# Patient Record
Sex: FEMALE | Race: WHITE | URBAN - METROPOLITAN AREA
[De-identification: names, ages, dates, MRNs, and addresses within clinical notes are randomized per-mention and may not be internally consistent; named-entity substitution may affect disease eponyms.]

---

## 2017-02-16 ENCOUNTER — OFFICE VISIT (OUTPATIENT)
Dept: FAMILY MEDICINE CLINIC | Age: 57
End: 2017-02-16

## 2017-02-16 VITALS
HEIGHT: 64 IN | RESPIRATION RATE: 18 BRPM | HEART RATE: 79 BPM | BODY MASS INDEX: 30.73 KG/M2 | TEMPERATURE: 96.9 F | OXYGEN SATURATION: 98 % | WEIGHT: 180 LBS | SYSTOLIC BLOOD PRESSURE: 120 MMHG | DIASTOLIC BLOOD PRESSURE: 88 MMHG

## 2017-02-16 DIAGNOSIS — R06.02 SOB (SHORTNESS OF BREATH): ICD-10-CM

## 2017-02-16 DIAGNOSIS — E04.1 THYROID NODULE: ICD-10-CM

## 2017-02-16 DIAGNOSIS — R59.0 ABDOMINAL LYMPHADENOPATHY: Primary | ICD-10-CM

## 2017-02-16 DIAGNOSIS — D25.9 UTERINE LEIOMYOMA, UNSPECIFIED LOCATION: ICD-10-CM

## 2017-02-16 DIAGNOSIS — I51.7 CARDIOMEGALY: ICD-10-CM

## 2017-02-16 PROBLEM — K80.20 CHOLELITHIASES: Status: ACTIVE | Noted: 2017-02-16

## 2017-02-16 PROBLEM — K44.9 HIATAL HERNIA: Status: ACTIVE | Noted: 2017-02-16

## 2017-02-16 NOTE — PATIENT INSTRUCTIONS
You have been referred to hematology & oncology. Please call one of the preferred providers listed below and schedule your appointment. Once you have scheduled your appointment, please call the office at [phone] and leave the details of your appointment (provider you will be seeing, appointment date and time) on the voice mail. [facility]  Dr. Betsey Magaña (blood/leukemia)  Dr. Kim Ho [address]., [facility] [address] [facility], [address] [facility]           Thyroid Nodules: Care Instructions  Your Care Instructions  Thyroid nodules are growths or lumps in the thyroid gland. Your thyroid is in the front of your neck. It controls how your body uses energy. You may have tests to see if the nodule is caused by cancer. Most nodules aren't cancer and don't cause problems. Many don't even need treatment. If you do have cancer, it can usually be cured. Treatment will probably include surgery. You may also get radioactive iodine treatment. If your thyroid can't make thyroid hormone after treatment, you can take a pill every day to replace the hormone. Follow-up care is a key part of your treatment and safety. Be sure to make and go to all appointments, and call your doctor if you are having problems. It's also a good idea to know your test results and keep a list of the medicines you take. How can you care for yourself at home? · Be safe with medicines. If you take thyroid hormone medicine:  ¨ Take it exactly as prescribed. Call your doctor if you think you are having a problem with your medicine. If you take the right amount and don't skip doses, you probably won't have side effects. ¨ Do not take it with calcium, vitamins, or iron. ¨ Try not to miss a dose. ¨ Do not take extra doses. This will not help you get better any faster. It may also cause side effects. ¨ Tell your doctor about any medicines you take. This includes over-the-counter medicines.   ¨ Wear a medical alert bracelet or necklace that says you take thyroid hormones. You can buy these at most drugstores. When should you call for help? Call 911 anytime you think you may need emergency care. For example, call if:  · You lose consciousness. Call your doctor now or seek immediate medical care if:  · You have shortness of breath. Watch closely for changes in your health, and be sure to contact your doctor if:  · You have pain in your neck, jaw, or ear. · You have problems swallowing. · You have a \"tickle\" in your throat. · You feel weak and tired. · You have nervousness, a fast heartbeat, hand tremors, problems sleeping, increased sweating, and weight loss. · You do not feel better even though you are taking your medicine. Where can you learn more? Go to http://olesya-mikayla.info/. Enter C460 in the search box to learn more about \"Thyroid Nodules: Care Instructions. \"  Current as of: July 28, 2016  Content Version: 11.1  © 9299-2798 Healthwise, Incorporated. Care instructions adapted under license by [employer] (which disclaims liability or warranty for this information). If you have questions about a medical condition or this instruction, always ask your healthcare professional. [address] any warranty or liability for your use of this information.

## 2017-02-16 NOTE — MR AVS SNAPSHOT
Visit Information Date & Time Provider Department Dept. Phone Encounter #  
 2/16/2017  3:00 PM Moises Stratton MD 3 [facility] [phone] Upcoming Health Maintenance Date Due  
 PAP AKA CERVICAL CYTOLOGY 1/13/2015 BREAST CANCER SCRN MAMMOGRAM 4/21/2017 DTaP/Tdap/Td series (2 - Td) 7/1/2025 COLONOSCOPY 12/2/2026 Allergies as of 2/16/2017  Review Complete On: 2/16/2017 By: Cuong Ferguson LPN No Known Allergies Current Immunizations  Reviewed on 8/11/2015 Name Date Influenza Vaccine 10/14/2014 Influenza Vaccine (Quad) PF 11/18/2016  9:04 AM  
 Pneumococcal Polysaccharide (PPSV-23) 7/1/2015 Tdap 7/1/2015 Not reviewed this visit You Were Diagnosed With   
  
 Codes Comments Abdominal lymphadenopathy    -  Primary ICD-10-CM: R59.0 ICD-9-CM: 882. 6 Thyroid nodule     ICD-10-CM: E04.1 ICD-9-CM: 241.0 Cardiomegaly     ICD-10-CM: I51.7 ICD-9-CM: 429.3 SOB (shortness of breath)     ICD-10-CM: R06.02 
ICD-9-CM: 786.05 Uterine leiomyoma, unspecified location     ICD-10-CM: D25.9 ICD-9-CM: 218.9 Vitals BP Pulse Temp Resp Height(growth percentile) Weight(growth percentile) 120/88 79 96.9 °F (36.1 °C) (Oral) 18 5' 4.02\" (1.626 m) 180 lb (81.6 kg) SpO2 BMI OB Status Smoking Status 98% 30.88 kg/m2 Postmenopausal Never Smoker BMI and BSA Data Body Mass Index Body Surface Area  
 30.88 kg/m 2 1.92 m 2 Preferred Pharmacy Pharmacy Name Phone 305 [facility], [address] [address] [place] [address] [address] Your Updated Medication List  
  
   
This list is accurate as of: 2/16/17  4:02 PM.  Always use your most recent med list.  
  
  
  
  
 acyclovir 400 mg tablet Commonly known as:  ZOVIRAX Take 1 Tab by mouth two (2) times a day. Take for 5 days as needed  Indications: GENITAL HERPES SIMPLEX  
  
 ketoconazole 200 mg tablet Commonly known as:  NIZORAL Take 1 Tab by mouth daily. levothyroxine 137 mcg tablet Commonly known as:  SYNTHROID Take 137 mcg by mouth Daily (before breakfast). lisinopril-hydroCHLOROthiazide 20-12.5 mg per tablet Commonly known as:  Janyth Huddle Take 1 Tab by mouth daily. omeprazole 20 mg capsule Commonly known as:  PriLOSEC Take 1 Cap by mouth daily. We Performed the Following REFERRAL TO HEMATOLOGY ONCOLOGY [XYE67 Custom] Comments:  
 PT WILL SCHEDULE APPT REFERRAL TO OBSTETRICS AND GYNECOLOGY [REF51 Custom] Comments:  
 PT WILL MAKE APPT. To-Do List   
 02/16/2017 ECHO:  2D ECHO COMPLETE ADULT (TTE) W OR WO CONTR   
  
 02/16/2017 Imaging:  US THYROID/PARATHYROID/SOFT TISS Referral Information Referral ID Referred By Referred To  
  
 [id number] [facility] [place] [address] Phone: [phone] Fax: [phone] Visits Status Start Date End Date 1 New Request 2/16/17 2/16/18 If your referral has a status of pending review or denied, additional information will be sent to support the outcome of this decision. Referral ID Referred By Referred To  
 [id number] Caleb Simon DO  
   [address] [facility] [address] Phone: [phone] Fax: [phone] Visits Status Start Date End Date 1 New Request 2/16/17 2/16/18 If your referral has a status of pending review or denied, additional information will be sent to support the outcome of this decision. Patient Instructions You have been referred to hematology & oncology. Please call one of the preferred providers listed below and schedule your appointment.   Once you have scheduled your appointment, please call the office at [phone] and leave the details of your appointment (provider you will be seeing, appointment date and time) on the voice mail. [address] Dr. Grace Munguia (blood/leukemia) Dr. Oniel [address]ras[address].,  [place], [address] [facility] Thyroid Nodules: Care Instructions Your Care Instructions Thyroid nodules are growths or lumps in the thyroid gland. Your thyroid is in the front of your neck. It controls how your body uses energy. You may have tests to see if the nodule is caused by cancer. Most nodules aren't cancer and don't cause problems. Many don't even need treatment. If you do have cancer, it can usually be cured. Treatment will probably include surgery. You may also get radioactive iodine treatment. If your thyroid can't make thyroid hormone after treatment, you can take a pill every day to replace the hormone. Follow-up care is a key part of your treatment and safety. Be sure to make and go to all appointments, and call your doctor if you are having problems. It's also a good idea to know your test results and keep a list of the medicines you take. How can you care for yourself at home? · Be safe with medicines. If you take thyroid hormone medicine: ¨ Take it exactly as prescribed. Call your doctor if you think you are having a problem with your medicine. If you take the right amount and don't skip doses, you probably won't have side effects. ¨ Do not take it with calcium, vitamins, or iron. ¨ Try not to miss a dose. ¨ Do not take extra doses. This will not help you get better any faster. It may also cause side effects. ¨ Tell your doctor about any medicines you take. This includes over-the-counter medicines. ¨ Wear a medical alert bracelet or necklace that says you take thyroid hormones. You can buy these at most [employer]. When should you call for help? Call 911 anytime you think you may need emergency care. For example, call if: 
· You lose consciousness. Call your doctor now or seek immediate medical care if: · You have shortness of breath. Watch closely for changes in your health, and be sure to contact your doctor if: 
· You have pain in your neck, jaw, or ear. · You have problems swallowing. · You have a \"tickle\" in your throat. · You feel weak and tired. · You have nervousness, a fast heartbeat, hand tremors, problems sleeping, increased sweating, and weight loss. · You do not feel better even though you are taking your medicine. Where can you learn more? Go to http://olesya-mikayla.info/. Enter E684 in the search box to learn more about \"Thyroid Nodules: Care Instructions. \" Current as of: July 28, 2016 Content Version: 11.1 © 3717-9761 [employer], Incorporated. Care instructions adapted under license by [employer] (which disclaims liability or warranty for this information). If you have questions about a medical condition or this instruction, always ask your healthcare professional. [place] [address] any warranty or liability for your use of this information. Introducing [facility]! [employer] introduces SWIIM System patient portal. Now you can access parts of your medical record, email your doctor's office, and request medication refills online. 1. In your internet browser, go to [email or web address] 2. Click on the First Time User? Click Here link in the Sign In box. You will see the New Member Sign Up page. 3. Enter your SWIIM System Access Code exactly as it appears below. You will not need to use this code after youve completed the sign-up process. If you do not sign up before the expiration date, you must request a new code. · SWIIM System Access Code: [email or web address] Expires: 5/17/2017  4:02 PM 
 
4. Enter the last four digits of your Social Security Number (xxxx) and Date of Birth (mm/dd/yyyy) as indicated and click Submit. You will be taken to the next sign-up page. 5. Create a Vanilla Breeze ID. This will be your Vanilla Breeze login ID and cannot be changed, so think of one that is secure and easy to remember. 6. Create a Vanilla Breeze password. You can change your password at any time. 7. Enter your Password Reset Question and Answer. This can be used at a later time if you forget your password. 8. Enter your e-mail address. You will receive e-mail notification when new information is available in [address] 9. Click Sign Up. You can now view and download portions of your medical record. 10. Click the Download Summary menu link to download a portable copy of your medical information. If you have questions, please visit the Frequently Asked Questions section of the Vanilla Breeze website. Remember, Vanilla Breeze is NOT to be used for urgent needs. For medical emergencies, dial 911. Now available from your iPhone and Android! Please provide this summary of care documentation to your next provider. Your primary care clinician is listed as Cecy Miller. If you have any questions after today's visit, please call [phone].

## 2017-02-16 NOTE — PROGRESS NOTES
1. Have you been to the ER, urgent care clinic since your last visit? Hospitalized since your last visit?no    2. Have you seen or consulted any other health care providers outside of the [address] [place] [address] since your last visit? Include any pap smears or colon screening. No    Patient Caro Tapia is a 60 y.o. female presents today for consultation for virtual physical scan result.

## 2017-02-16 NOTE — PROGRESS NOTES
Assessment/Plan:    Blessing Brown was seen today for advice only. Diagnoses and all orders for this visit:    Abdominal lymphadenopathy  -     REFERRAL TO HEMATOLOGY ONCOLOGY    Thyroid nodule  -     US THYROID/PARATHYROID/SOFT TISS; Future    Cardiomegaly  -     2D ECHO COMPLETE ADULT (TTE) W OR WO CONTR; Future    SOB (shortness of breath)  -     2D ECHO COMPLETE ADULT (TTE) W OR WO CONTR; Future    Uterine leiomyoma, unspecified location  -     REFERRAL TO OBSTETRICS AND GYNECOLOGY        Will do the above evaluation and contact pt with results. Will await recommendations from the specialists. The plan was discussed with the patient. The patient verbalized understanding and is in agreement with the plan. All medication potential side effects were discussed with the patient.    -------------------------------------------------------------------------------------------------------------------        Michelle Sinclair is a 60 y.o. female and presents with Advice Only         Subjective:  Pt comes today because she wanted to discuss some findings on a recent virtual physical that she had. It essentially was a almost full body CT. She has felt very well and does not have any complaints. The following was noted on the CT:    1. A 6 mm nodule in the thyroid. 2. Periaortic enlarged LNs, that have been stable over the last 3 years. 3. Multiple gallstones. 4. Hiatal hernia  5. A 6.6 cm calcified mass in the uterus, appearing to be a fibroid. 6. The cardiac silhouette is enlarged. She has a strong fam hx for serious health issues and she wants to be proactive about her health. ROS:  Constitutional: No recent weight change. No weakness/fatigue. No f/c. Skin: No rashes, change in nails/hair, itching   HENT: No HA, dizziness. No hearing loss/tinnitus. No nasal congestion/discharge. Eyes: No change in vision, double/blurred vision or eye pain/redness. Cardiovascular: No CP/palpitations.   No BOLES/orthopnea/PND. Respiratory: No cough/sputum, dyspnea, wheezing. Gastointestinal: No dysphagia, reflux. No n/v. No constipation/diarrhea. No melena/rectal bleeding. Genitourinary: No dysuria, urinary hesitancy, nocturia, hematuria. No incontinence. Musculoskeletal: No joint pain/stiffness. No muscle pain/tenderness. Endo: No heat/cold intolerance, no polyuria/polydypsia. Heme: No h/o anemia. No easy bleeding/bruising. Allergy/Immunology: No seasonal rhinitis. Denies frequent colds, sinus/ear infections. Neurological: No seizures/numbness/weakness. No paresthesias. Psychiatric:  No depression, anxiety. The problem list was updated as a part of today's visit. Patient Active Problem List   Diagnosis Code    History of bacterial pneumonia Z87.01    Herpes simplex B00.9    Hypothyroidism E03.9    GERD (gastroesophageal reflux disease) K21.9    Tinea corporis B35.4       The PSH, FH were reviewed. SH:  Social History   Substance Use Topics    Smoking status: Never Smoker    Smokeless tobacco: Never Used    Alcohol use 4.2 oz/week     7 Glasses of wine per week       Medications/Allergies:  Current Outpatient Prescriptions on File Prior to Visit   Medication Sig Dispense Refill    omeprazole (PRILOSEC) 20 mg capsule Take 1 Cap by mouth daily. 90 Cap 2    acyclovir (ZOVIRAX) 400 mg tablet Take 1 Tab by mouth two (2) times a day. Take for 5 days as needed  Indications: GENITAL HERPES SIMPLEX 90 Tab 2    levothyroxine (SYNTHROID) 137 mcg tablet Take 137 mcg by mouth Daily (before breakfast). 90 Tab 2    lisinopril-hydrochlorothiazide (PRINZIDE, ZESTORETIC) 20-12.5 mg per tablet Take 1 Tab by mouth daily. 90 Tab 2    ketoconazole (NIZORAL) 200 mg tablet Take 1 Tab by mouth daily. 30 Tab 0     No current facility-administered medications on file prior to visit.          No Known Allergies      Health Maintenance:   Health Maintenance   Topic Date Due    PAP AKA CERVICAL CYTOLOGY  01/13/2015    BREAST CANCER SCRN MAMMOGRAM  04/21/2017    DTaP/Tdap/Td series (2 - Td) 07/01/2025    COLONOSCOPY  12/02/2026    Hepatitis C Screening  Completed    INFLUENZA AGE 9 TO ADULT  Completed       Objective:  Visit Vitals    /88    Pulse 79    Temp 96.9 °F (36.1 °C) (Oral)    Resp 18    Ht 5' 4.02\" (1.626 m)    Wt 180 lb (81.6 kg)    SpO2 98%    BMI 30.88 kg/m2          Nurses notes and VS reviewed. Physical Examination: General appearance - alert, well appearing, and in no distress  Chest - clear to auscultation, no wheezes, rales or rhonchi, symmetric air entry  Heart - normal rate, regular rhythm, normal S1, S2, no murmurs, rubs, clicks or gallops  Abdomen - soft, nontender, nondistended, no masses or organomegaly        Labwork and Ancillary Studies:    CBC w/Diff  Lab Results   Component Value Date/Time    WBC 4.6 06/17/2016 08:35 AM    HGB 13.4 06/17/2016 08:35 AM    PLATELET 218 63/56/8171 08:35 AM         Basic Metabolic Profile  Lab Results   Component Value Date/Time    Sodium 141 06/17/2016 08:35 AM    Potassium 3.9 06/17/2016 08:35 AM    Chloride 103 06/17/2016 08:35 AM    CO2 29 06/17/2016 08:35 AM    Anion gap 9 06/17/2016 08:35 AM    Glucose 98 06/17/2016 08:35 AM    BUN 17 06/17/2016 08:35 AM    Creatinine 0.90 06/17/2016 08:35 AM    BUN/Creatinine ratio 19 06/17/2016 08:35 AM    GFR est AA >60 06/17/2016 08:35 AM    GFR est non-AA >60 06/17/2016 08:35 AM    Calcium 9.0 06/17/2016 08:35 AM         LFT  Lab Results   Component Value Date/Time    ALT (SGPT) 18 06/17/2016 08:35 AM    AST (SGOT) 12 06/17/2016 08:35 AM    Alk.  phosphatase 88 06/17/2016 08:35 AM    Bilirubin, direct 0.2 06/17/2016 08:35 AM    Bilirubin, total 0.7 06/17/2016 08:35 AM         Cholesterol  Lab Results   Component Value Date/Time    Cholesterol, total 149 06/17/2016 08:35 AM    HDL Cholesterol 66 06/17/2016 08:35 AM    LDL, calculated 66.2 06/17/2016 08:35 AM    Triglyceride 84 06/17/2016 08:35 AM    CHOL/HDL Ratio 2.3 06/17/2016 08:35 AM

## 2017-03-06 ENCOUNTER — OFFICE VISIT (OUTPATIENT)
Dept: OBGYN CLINIC | Age: 57
End: 2017-03-06

## 2017-03-06 ENCOUNTER — HOSPITAL ENCOUNTER (OUTPATIENT)
Dept: LAB | Age: 57
Discharge: HOME | End: 2017-03-06
Payer: COMMERCIAL

## 2017-03-06 VITALS
DIASTOLIC BLOOD PRESSURE: 85 MMHG | SYSTOLIC BLOOD PRESSURE: 118 MMHG | HEART RATE: 86 BPM | RESPIRATION RATE: 18 BRPM | HEIGHT: 64 IN | BODY MASS INDEX: 30.39 KG/M2 | WEIGHT: 178 LBS

## 2017-03-06 DIAGNOSIS — D25.2 SUBSEROUS LEIOMYOMA OF UTERUS: ICD-10-CM

## 2017-03-06 DIAGNOSIS — Z01.411 ENCOUNTER FOR GYNECOLOGICAL EXAMINATION WITH ABNORMAL FINDING: Primary | ICD-10-CM

## 2017-03-06 PROCEDURE — 88175 CYTOPATH C/V AUTO FLUID REDO: CPT | Performed by: OBSTETRICS & GYNECOLOGY

## 2017-03-06 PROCEDURE — 87624 HPV HI-RISK TYP POOLED RSLT: CPT | Performed by: OBSTETRICS & GYNECOLOGY

## 2017-03-06 NOTE — PROGRESS NOTES
Subjective:   60 y.o. female for Well Woman Check. No LMP recorded. Patient is postmenopausal.    Social History: not sexually active. Pertinent past medical hstory: no history of HTN, DVT, CAD, DM, liver disease, migraines or smoking. Current Outpatient Prescriptions   Medication Sig Dispense Refill    omeprazole (PRILOSEC) 20 mg capsule Take 1 Cap by mouth daily. 90 Cap 2    acyclovir (ZOVIRAX) 400 mg tablet Take 1 Tab by mouth two (2) times a day. Take for 5 days as needed  Indications: GENITAL HERPES SIMPLEX 90 Tab 2    levothyroxine (SYNTHROID) 137 mcg tablet Take 137 mcg by mouth Daily (before breakfast). 90 Tab 2    lisinopril-hydrochlorothiazide (PRINZIDE, ZESTORETIC) 20-12.5 mg per tablet Take 1 Tab by mouth daily. 90 Tab 2    ketoconazole (NIZORAL) 200 mg tablet Take 1 Tab by mouth daily. 27 Tab 0     No Known Allergies  Past Medical History:   Diagnosis Date    Cholelithiases 2017    GERD (gastroesophageal reflux disease)     H/O cardiovascular stress test 3/9/2010    normal    H/O echocardiogram 3/25/2010    normal    Herpes simplex     Hiatal hernia 2017    History of bacterial pneumonia     Hypothyroidism     Thyroid nodule 2017    Tinea corporis 2016    Uterine fibroid 2017     Past Surgical History:   Procedure Laterality Date    HX  SECTION      HX DILATION AND CURETTAGE  4141,8885    HX GYN      rt breast lump     Family History   Problem Relation Age of Onset    Hypertension Mother     Cancer Father      bone cancer    Cancer Sister      large neuroendocrine carcinoma      No Known Problems Brother     No Known Problems Sister      Social History   Substance Use Topics    Smoking status: Never Smoker    Smokeless tobacco: Never Used    Alcohol use 4.2 oz/week     7 Glasses of wine per week        ROS:  Feeling well. No dyspnea or chest pain on exertion. No abdominal pain, change in bowel habits, black or bloody stools.   No urinary tract symptoms. GYN ROS: no breast pain or new or enlarging lumps on self exam, no vaginal bleeding, no discharge or pelvic pain. No neurological complaints. Objective:     Visit Vitals    /85    Pulse 86    Resp 18    Ht 5' 4\" (1.626 m)    Wt 178 lb (80.7 kg)    BMI 30.55 kg/m2     The patient appears well, alert, oriented x 3, in no distress. ENT normal.  Neck supple. No adenopathy or thyromegaly. MICHAEL. Lungs are clear, good air entry, no wheezes, rhonchi or rales. S1 and S2 normal, no murmurs, regular rate and rhythm. Abdomen soft without tenderness, guarding, mass or organomegaly. Extremities show no edema, normal peripheral pulses. Neurological is normal, no focal findings. BREAST EXAM: breasts appear normal, no suspicious masses, no skin or nipple changes or axillary nodes    PELVIC EXAM: normal external genitalia, vulva, vagina, cervix, uterus and adnexa, PAP: Pap smear done today, HPV test    CT scan from 1/26/17 reviewed and discussed: 6.6 cm calcified pelvic mass likely representing a pedunculated fibroid    Assessment/Plan:   well woman  Uterine mass, pelvic ultrasound pending. mammogram  pap smear  return annually or prn  Will contact the patient with the result of the ultrasound. Plan of care discussed. Patient expressed understanding.

## 2017-03-21 ENCOUNTER — TELEPHONE (OUTPATIENT)
Dept: FAMILY MEDICINE CLINIC | Age: 57
End: 2017-03-21

## 2017-03-21 NOTE — TELEPHONE ENCOUNTER
[facility] called; they need the pt's last OV notes, pt last 2 labs and the pathology report;  Fax # [phone]

## 2017-05-10 RX ORDER — LEVOTHYROXINE SODIUM 137 UG/1
TABLET ORAL
Qty: 90 TAB | Refills: 0 | Status: SHIPPED | OUTPATIENT
Start: 2017-05-10 | End: 2017-07-29 | Stop reason: SDUPTHER

## 2017-07-30 DIAGNOSIS — Z00.00 ANNUAL PHYSICAL EXAM: Primary | ICD-10-CM

## 2017-07-30 DIAGNOSIS — E03.4 HYPOTHYROIDISM DUE TO ACQUIRED ATROPHY OF THYROID: ICD-10-CM

## 2017-08-11 RX ORDER — LEVOTHYROXINE SODIUM 137 UG/1
TABLET ORAL
Qty: 90 TAB | Refills: 1 | Status: SHIPPED | OUTPATIENT
Start: 2017-08-11 | End: 2018-01-12 | Stop reason: SDUPTHER

## 2017-11-20 NOTE — TELEPHONE ENCOUNTER
Patient is requesting to have a generic and a 90 day supply sent in. Patient is calling stating that she has lost her insurance  And is wondering if the provider can advise her of what lab work is needed to be done , where it will not cost the most. Patient is worried about her thyroid and her High Blood pressure.      Please advise

## 2017-11-23 DIAGNOSIS — E03.4 HYPOTHYROIDISM DUE TO ACQUIRED ATROPHY OF THYROID: Primary | ICD-10-CM

## 2017-11-23 DIAGNOSIS — I10 ESSENTIAL HYPERTENSION: ICD-10-CM

## 2017-11-23 RX ORDER — LISINOPRIL AND HYDROCHLOROTHIAZIDE 12.5; 2 MG/1; MG/1
1 TABLET ORAL DAILY
Qty: 90 TAB | Refills: 0 | Status: SHIPPED | OUTPATIENT
Start: 2017-11-23 | End: 2017-11-27 | Stop reason: SDUPTHER

## 2017-11-23 NOTE — TELEPHONE ENCOUNTER
See message below. As I previously mentioned to her, she is due for a physical.  I realize that she does not have insurance but I will order just a few labs that are important in order to maintain her health and based on the medications she takes.

## 2017-11-27 RX ORDER — LISINOPRIL AND HYDROCHLOROTHIAZIDE 12.5; 2 MG/1; MG/1
1 TABLET ORAL DAILY
Qty: 10 TAB | Refills: 0 | Status: SHIPPED | OUTPATIENT
Start: 2017-11-27 | End: 2018-01-12 | Stop reason: SDUPTHER

## 2017-11-27 NOTE — TELEPHONE ENCOUNTER
Patient is requesting to have a 10 day supply called in for her medication. Her mail order will not be sent to her for another 10 days.      Patient is completely out of the Lisinopril-Hydrochlorothiazide 12.5 mg.     Please send the medication to the [address]   p) [phone]

## 2018-01-12 ENCOUNTER — OFFICE VISIT (OUTPATIENT)
Dept: FAMILY MEDICINE CLINIC | Age: 58
End: 2018-01-12

## 2018-01-12 VITALS
TEMPERATURE: 98 F | BODY MASS INDEX: 31.76 KG/M2 | HEART RATE: 80 BPM | DIASTOLIC BLOOD PRESSURE: 80 MMHG | WEIGHT: 186 LBS | HEIGHT: 64 IN | SYSTOLIC BLOOD PRESSURE: 124 MMHG | OXYGEN SATURATION: 97 % | RESPIRATION RATE: 20 BRPM

## 2018-01-12 DIAGNOSIS — L29.9 PRURITUS: ICD-10-CM

## 2018-01-12 DIAGNOSIS — Z00.00 ANNUAL PHYSICAL EXAM: Primary | ICD-10-CM

## 2018-01-12 LAB
A-G RATIO,AGRAT: 1.4 RATIO (ref 1.1–2.6)
A-G RATIO,AGRAT: 1.4 RATIO (ref 1.1–2.6)
ABSOLUTE LYMPHOCYTE COUNT, 10803: 1.7 K/UL (ref 1–4.8)
ALBUMIN SERPL-MCNC: 4 G/DL (ref 3.5–5)
ALBUMIN SERPL-MCNC: 4 G/DL (ref 3.5–5)
ALP SERPL-CCNC: 79 U/L (ref 25–115)
ALP SERPL-CCNC: 79 U/L (ref 25–115)
ALT SERPL-CCNC: 11 U/L (ref 5–40)
ALT SERPL-CCNC: 11 U/L (ref 5–40)
ANION GAP SERPL CALC-SCNC: 15 MMOL/L
AST SERPL W P-5'-P-CCNC: 14 U/L (ref 10–37)
AST SERPL W P-5'-P-CCNC: 14 U/L (ref 10–37)
BASOPHILS # BLD: 0 K/UL (ref 0–0.2)
BASOPHILS NFR BLD: 1 % (ref 0–2)
BILIRUB SERPL-MCNC: 0.2 MG/DL (ref 0.2–1.2)
BILIRUB SERPL-MCNC: 0.2 MG/DL (ref 0.2–1.2)
BILIRUBIN, DIRECT,CBIL: <0.2 MG/DL (ref 0–0.3)
BUN SERPL-MCNC: 23 MG/DL (ref 6–22)
CALCIUM SERPL-MCNC: 9.1 MG/DL (ref 8.4–10.5)
CHLORIDE SERPL-SCNC: 101 MMOL/L (ref 98–110)
CHOLEST SERPL-MCNC: 176 MG/DL (ref 110–200)
CO2 SERPL-SCNC: 27 MMOL/L (ref 20–32)
CREAT SERPL-MCNC: 1 MG/DL (ref 0.5–1.2)
EOSINOPHIL # BLD: 0.2 K/UL (ref 0–0.5)
EOSINOPHIL NFR BLD: 3 % (ref 0–6)
ERYTHROCYTE [DISTWIDTH] IN BLOOD BY AUTOMATED COUNT: 13.8 % (ref 10–16)
GFRAA, 66117: >60
GFRNA, 66118: 54.2
GLOBULIN,GLOB: 2.9 G/DL (ref 2–4)
GLOBULIN,GLOB: 2.9 G/DL (ref 2–4)
GLUCOSE SERPL-MCNC: 102 MG/DL (ref 70–99)
GRANULOCYTES,GRANS: 53 % (ref 40–75)
HCT VFR BLD AUTO: 40.1 % (ref 35.1–48)
HDLC SERPL-MCNC: 74 MG/DL (ref 40–59)
HGB BLD-MCNC: 13.2 G/DL (ref 11.7–16)
LDLC SERPL CALC-MCNC: 86 MG/DL (ref 50–99)
LYMPHOCYTES, LYMLT: 32 % (ref 27–45)
MCH RBC QN AUTO: 30 PG (ref 26–34)
MCHC RBC AUTO-ENTMCNC: 33 G/DL (ref 32–36)
MCV RBC AUTO: 92 FL (ref 80–95)
MONOCYTES # BLD: 0.6 K/UL (ref 0.1–0.9)
MONOCYTES NFR BLD: 11 % (ref 3–9)
NEUTROPHILS # BLD AUTO: 2.7 K/UL (ref 1.8–7.7)
PLATELET # BLD AUTO: 280 K/UL (ref 140–440)
PMV BLD AUTO: 10.4 FL (ref 6–10.8)
POTASSIUM SERPL-SCNC: 4 MMOL/L (ref 3.5–5.5)
PROT SERPL-MCNC: 6.9 G/DL (ref 6.4–8.3)
PROT SERPL-MCNC: 6.9 G/DL (ref 6.4–8.3)
RBC # BLD AUTO: 4.35 M/UL (ref 3.8–5.2)
SODIUM SERPL-SCNC: 143 MMOL/L (ref 133–145)
TRIGL SERPL-MCNC: 78 MG/DL (ref 40–149)
TSH SERPL DL<=0.005 MIU/L-ACNC: 2.52 MCU/ML (ref 0.27–4.2)
VLDLC SERPL CALC-MCNC: 16 MG/DL (ref 8–30)
WBC # BLD AUTO: 5.2 K/UL (ref 4–11)

## 2018-01-12 RX ORDER — LISINOPRIL AND HYDROCHLOROTHIAZIDE 12.5; 2 MG/1; MG/1
1 TABLET ORAL DAILY
Qty: 90 TAB | Refills: 1 | Status: SHIPPED | OUTPATIENT
Start: 2018-01-12

## 2018-01-12 RX ORDER — OMEPRAZOLE 20 MG/1
CAPSULE, DELAYED RELEASE ORAL
Qty: 90 CAP | Refills: 1 | Status: SHIPPED | OUTPATIENT
Start: 2018-01-12

## 2018-01-12 RX ORDER — HYDROXYZINE 25 MG/1
25 TABLET, FILM COATED ORAL
Qty: 30 TAB | Refills: 2 | Status: SHIPPED | OUTPATIENT
Start: 2018-01-12

## 2018-01-12 RX ORDER — LEVOTHYROXINE SODIUM 137 UG/1
TABLET ORAL
Qty: 90 TAB | Refills: 1 | Status: SHIPPED | OUTPATIENT
Start: 2018-01-12

## 2018-01-12 NOTE — PROGRESS NOTES
SUBJECTIVE:   59 y.o. female for annual routine checkup. Pt will be moving soon to [address] [place] [address]. Had her abdominoplasty with Dr. Yasmine Rossi, all went well. Current Outpatient Prescriptions   Medication Sig Dispense Refill    lisinopril-hydroCHLOROthiazide (PRINZIDE, ZESTORETIC) 20-12.5 mg per tablet Take 1 Tab by mouth daily. 90 Tab 1    omeprazole (PRILOSEC) 20 mg capsule Take 1 capsule by mouth  daily 90 Cap 1    levothyroxine (SYNTHROID) 137 mcg tablet Take 1 tablet by mouth  daily before breakfast 90 Tab 1    hydrOXYzine HCl (ATARAX) 25 mg tablet Take 1 Tab by mouth nightly as needed for Itching. Indications: Pruritus of Skin 30 Tab 2    acyclovir (ZOVIRAX) 400 mg tablet Take 1 Tab by mouth two (2) times a day. Take for 5 days as needed  Indications: GENITAL HERPES SIMPLEX 90 Tab 2    ketoconazole (NIZORAL) 200 mg tablet Take 1 Tab by mouth daily. 27 Tab 0       Past Medical History:   Diagnosis Date    Cholelithiases 2/16/2017    GERD (gastroesophageal reflux disease)     H/O cardiovascular stress test 3/9/2010    normal    H/O echocardiogram 3/25/2010    normal    Herpes simplex     Hiatal hernia 2/16/2017    History of bacterial pneumonia     HTN (hypertension) 11/23/2017    Hypothyroidism     Thyroid nodule 2/16/2017    Tinea corporis 8/17/2016    Uterine fibroid 2/16/2017       Allergies: Review of patient's allergies indicates no known allergies. No LMP recorded. Patient is postmenopausal.      ROS:  Feeling well. No dyspnea or chest pain on exertion. No abdominal pain, change in bowel habits, black or bloody stools. No urinary tract symptoms. GYN ROS: no breast pain or new or enlarging lumps on self exam. No neurological complaints. OBJECTIVE:   The patient appears well, alert, oriented x 3, in no distress.     Visit Vitals    /80 (BP 1 Location: Right arm, BP Patient Position: Standing)    Pulse 80    Temp 98 °F (36.7 °C) (Oral)    Resp 20    Ht 5' 4\" (1.626 m)    Wt 186 lb (84.4 kg)    SpO2 97%    BMI 31.93 kg/m2       General appearance: alert, cooperative, no distress, appears stated age  Head: Normocephalic, without obvious abnormality, atraumatic  Ears: normal TM's and external ear canals AU  Throat: Lips, mucosa, and tongue normal. Teeth and gums normal  Neck: supple, symmetrical, trachea midline, no adenopathy, thyroid: not enlarged, symmetric, no tenderness/mass/nodules, no carotid bruit and no JVD  Back: symmetric, no curvature. ROM normal. No CVA tenderness. Lungs: clear to auscultation bilaterally  Heart: regular rate and rhythm, S1, S2 normal, no murmur, click, rub or gallop  Abdomen: soft, non-tender. Bowel sounds normal. No masses,  no organomegaly  Extremities: extremities normal, atraumatic, no cyanosis or edema  Pulses: 2+ and symmetric  Skin: Skin color, texture, turgor normal. No rashes or lesions  Neurological is normal, no focal findings. Lab Results   Component Value Date/Time    WBC 4.6 06/17/2016 08:35 AM    HGB 13.4 06/17/2016 08:35 AM    HCT 41.8 06/17/2016 08:35 AM    PLATELET 595 87/80/9284 08:35 AM    MCV 93.7 06/17/2016 08:35 AM         Lab Results   Component Value Date/Time    Sodium 141 06/17/2016 08:35 AM    Potassium 3.9 06/17/2016 08:35 AM    Chloride 103 06/17/2016 08:35 AM    CO2 29 06/17/2016 08:35 AM    Anion gap 9 06/17/2016 08:35 AM    Glucose 98 06/17/2016 08:35 AM    BUN 17 06/17/2016 08:35 AM    Creatinine 0.90 06/17/2016 08:35 AM    BUN/Creatinine ratio 19 06/17/2016 08:35 AM    GFR est AA >60 06/17/2016 08:35 AM    GFR est non-AA >60 06/17/2016 08:35 AM    Calcium 9.0 06/17/2016 08:35 AM         Lab Results   Component Value Date/Time    ALT (SGPT) 18 06/17/2016 08:35 AM    AST (SGOT) 12 06/17/2016 08:35 AM    Alk.  phosphatase 88 06/17/2016 08:35 AM    Bilirubin, direct 0.2 06/17/2016 08:35 AM    Bilirubin, total 0.7 06/17/2016 08:35 AM         Lab Results   Component Value Date/Time    Cholesterol, total 149 06/17/2016 08:35 AM    HDL Cholesterol 66 06/17/2016 08:35 AM    LDL, calculated 66.2 06/17/2016 08:35 AM    VLDL, calculated 16.8 06/17/2016 08:35 AM    Triglyceride 84 06/17/2016 08:35 AM    CHOL/HDL Ratio 2.3 06/17/2016 08:35 AM         Lab Results   Component Value Date/Time    TSH 2.99 11/18/2016 08:43 AM    T4, Free 1.3 11/18/2016 08:43 AM         Lab Results   Component Value Date/Time    Vitamin D 25-Hydroxy 39.5 06/17/2016 08:35 AM             ASSESSMENT:   Diagnoses and all orders for this visit:    1. Annual physical exam    2. Pruritus    Other orders  -     lisinopril-hydroCHLOROthiazide (PRINZIDE, ZESTORETIC) 20-12.5 mg per tablet; Take 1 Tab by mouth daily. -     omeprazole (PRILOSEC) 20 mg capsule; Take 1 capsule by mouth  daily  -     levothyroxine (SYNTHROID) 137 mcg tablet; Take 1 tablet by mouth  daily before breakfast  -     hydrOXYzine HCl (ATARAX) 25 mg tablet; Take 1 Tab by mouth nightly as needed for Itching. Indications: Pruritus of Skin          PLAN:   mammogram  pap smear     Pt was given 6 mon on her meds to last her until she finds a new PCP. Was reminded that she is a little past due for her mammogram.  She will get this done with her new doctor. Will try Atarax for itchy skin. The plan was discussed with the patient. The patient verbalized understanding and is in agreement with the plan. All medication potential side effects were discussed with the patient.

## 2018-01-12 NOTE — PATIENT INSTRUCTIONS
Well Visit, Women 48 to 72: Care Instructions  Your Care Instructions    Physical exams can help you stay healthy. Your doctor has checked your overall health and may have suggested ways to take good care of yourself. He or she also may have recommended tests. At home, you can help prevent illness with healthy eating, regular exercise, and other steps. Follow-up care is a key part of your treatment and safety. Be sure to make and go to all appointments, and call your doctor if you are having problems. It's also a good idea to know your test results and keep a list of the medicines you take. How can you care for yourself at home? · Reach and stay at a healthy weight. This will lower your risk for many problems, such as obesity, diabetes, heart disease, and high blood pressure. · Get at least 30 minutes of exercise on most days of the week. Walking is a good choice. You also may want to do other activities, such as running, swimming, cycling, or playing tennis or team sports. · Do not smoke. Smoking can make health problems worse. If you need help quitting, talk to your doctor about stop-smoking programs and medicines. These can increase your chances of quitting for good. · Protect your skin from too much sun. When you're outdoors from 10 a.m. to 4 p.m., stay in the shade or cover up with clothing and a hat with a wide brim. Wear sunglasses that block UV rays. Even when it's cloudy, put broad-spectrum sunscreen (SPF 30 or higher) on any exposed skin. · See a dentist one or two times a year for checkups and to have your teeth cleaned. · Wear a seat belt in the car. · Limit alcohol to 1 drink a day. Too much alcohol can cause health problems. Follow your doctor's advice about when to have certain tests. These tests can spot problems early. · Cholesterol.  Your doctor will tell you how often to have this done based on your age, family history, or other things that can increase your risk for heart attack and stroke. · Blood pressure. Have your blood pressure checked during a routine doctor visit. Your doctor will tell you how often to check your blood pressure based on your age, your blood pressure results, and other factors. · Mammogram. Ask your doctor how often you should have a mammogram, which is an X-ray of your breasts. A mammogram can spot breast cancer before it can be felt and when it is easiest to treat. · Pap test and pelvic exam. Ask your doctor how often you should have a Pap test. You may not need to have a Pap test as often as you used to. · Vision. Have your eyes checked every year or two or as often as your doctor suggests. Some experts recommend that you have yearly exams for glaucoma and other age-related eye problems starting at age 48. · Hearing. Tell your doctor if you notice any change in your hearing. You can have tests to find out how well you hear. · Diabetes. Ask your doctor whether you should have tests for diabetes. · Colon cancer. You should begin tests for colon cancer at age 48. You may have one of several tests. Your doctor will tell you how often to have tests based on your age and risk. Risks include whether you already had a precancerous polyp removed from your colon or whether your parents, sisters and brothers, or children have had colon cancer. · Thyroid disease. Talk to your doctor about whether to have your thyroid checked as part of a regular physical exam. Women have an increased chance of a thyroid problem. · Osteoporosis. You should begin tests for bone density at age 72. If you are younger than 72, ask your doctor whether you have factors that may increase your risk for this disease. You may want to have this test before age 72. · Heart attack and stroke risk. At least every 4 to 6 years, you should have your risk for heart attack and stroke assessed.  Your doctor uses factors such as your age, blood pressure, cholesterol, and whether you smoke or have diabetes to show what your risk for a heart attack or stroke is over the next 10 years. When should you call for help? Watch closely for changes in your health, and be sure to contact your doctor if you have any problems or symptoms that concern you. Where can you learn more? Go to http://olesya-mikayla.info/. Enter S093 in the search box to learn more about \"Well Visit, Women 50 to 72: Care Instructions. \"  Current as of: May 12, 2017  Content Version: 11.4  © 8548-1548 Healthwise, Incorporated. Care instructions adapted under license by [employer] (which disclaims liability or warranty for this information). If you have questions about a medical condition or this instruction, always ask your healthcare professional. [address] any warranty or liability for your use of this information.

## 2018-01-12 NOTE — MR AVS SNAPSHOT
Visit Information Date & Time Provider Department Dept. Phone Encounter #  
 1/12/2018  1418 [facility], [address] [place] [address] [phone] [id number] Upcoming Health Maintenance Date Due  
 BREAST CANCER SCRN MAMMOGRAM 4/21/2017 PAP AKA CERVICAL CYTOLOGY 3/6/2020 COLONOSCOPY 12/2/2026 DTaP/Tdap/Td series (3 - Td) 10/10/2027 Allergies as of 1/12/2018  Review Complete On: 1/12/2018 By: Paz Rojas LPN No Known Allergies Current Immunizations  Reviewed on 1/12/2018 Name Date Influenza Vaccine 10/10/2017, 10/14/2014 Influenza Vaccine (Quad) PF 11/18/2016  9:04 AM  
 Pneumococcal Polysaccharide (PPSV-23) 7/1/2015 Tdap 10/10/2017, 7/1/2015 Reviewed by Arlin Diaz MD on 1/12/2018 at  9:51 AM  
You Were Diagnosed With   
  
 Codes Comments Annual physical exam    -  Primary ICD-10-CM: Z00.00 ICD-9-CM: V70.0 Pruritus     ICD-10-CM: L29.9 ICD-9-CM: 698.9 Vitals BP Pulse Temp Resp Height(growth percentile) Weight(growth percentile) 124/80 (BP 1 Location: Right arm, BP Patient Position: Standing) 80 98 °F (36.7 °C) (Oral) 20 5' 4\" (1.626 m) 186 lb (84.4 kg) SpO2 BMI OB Status Smoking Status 97% 31.93 kg/m2 Postmenopausal Never Smoker BMI and BSA Data Body Mass Index Body Surface Area  
 31.93 kg/m 2 1.95 m 2 Preferred Pharmacy Pharmacy Name Phone [id number] [address] Your Updated Medication List  
  
   
This list is accurate as of: 1/12/18  9:57 AM.  Always use your most recent med list.  
  
  
  
  
 acyclovir 400 mg tablet Commonly known as:  ZOVIRAX Take 1 Tab by mouth two (2) times a day. Take for 5 days as needed  Indications: GENITAL HERPES SIMPLEX  
  
 hydrOXYzine HCl 25 mg tablet Commonly known as:  ATARAX Take 1 Tab by mouth nightly as needed for Itching. Indications: Pruritus of Skin ketoconazole 200 mg tablet Commonly known as:  NIZORAL Take 1 Tab by mouth daily. levothyroxine 137 mcg tablet Commonly known as:  SYNTHROID Take 1 tablet by mouth  daily before breakfast  
  
 lisinopril-hydroCHLOROthiazide 20-12.5 mg per tablet Commonly known as:  Luly Fabian Take 1 Tab by mouth daily. omeprazole 20 mg capsule Commonly known as:  PRILOSEC Take 1 capsule by mouth  daily Prescriptions Sent to Pharmacy Refills  
 lisinopril-hydroCHLOROthiazide (PRINZIDE, ZESTORETIC) 20-12.5 mg per tablet 1 Sig: Take 1 Tab by mouth daily. Class: Normal  
 Pharmacy: [facility] [address] [place] [address] Ph #: [phone] Route: Oral  
 omeprazole (PRILOSEC) 20 mg capsule 1 Sig: Take 1 capsule by mouth  daily Class: Normal  
 [id number] [address] Ph #: [phone]  
 levothyroxine (SYNTHROID) 137 mcg tablet 1 Sig: Take 1 tablet by mouth  daily before breakfast  
 Class: Normal  
 Pharmacy: [facility] #[id number] - [address] Ph #: [phone]  
 hydrOXYzine HCl (ATARAX) 25 mg tablet 2 Sig: Take 1 Tab by mouth nightly as needed for Itching. Indications: Pruritus of Skin Class: Normal  
 Pharmacy: [facility]/pharmacy [address] [place] [address] Ph #: [phone] Route: Oral  
  
Patient Instructions Well Visit, Women 48 to 72: Care Instructions Your Care Instructions Physical exams can help you stay healthy. Your doctor has checked your overall health and may have suggested ways to take good care of yourself. He or she also may have recommended tests. At home, you can help prevent illness with healthy eating, regular exercise, and other steps. Follow-up care is a key part of your treatment and safety.  Be sure to make and go to all appointments, and call your doctor if you are having problems. It's also a good idea to know your test results and keep a list of the medicines you take. How can you care for yourself at home? · Reach and stay at a healthy weight. This will lower your risk for many problems, such as obesity, diabetes, heart disease, and high blood pressure. · Get at least 30 minutes of exercise on most days of the week. Walking is a good choice. You also may want to do other activities, such as running, swimming, cycling, or playing tennis or team sports. · Do not smoke. Smoking can make health problems worse. If you need help quitting, talk to your doctor about stop-smoking programs and medicines. These can increase your chances of quitting for good. · Protect your skin from too much sun. When you're outdoors from 10 a.m. to 4 p.m., stay in the shade or cover up with clothing and a hat with a wide brim. Wear sunglasses that block UV rays. Even when it's cloudy, put broad-spectrum sunscreen (SPF 30 or higher) on any exposed skin. · See a dentist one or two times a year for checkups and to have your teeth cleaned. · Wear a seat belt in the car. · Limit alcohol to 1 drink a day. Too much alcohol can cause health problems. Follow your doctor's advice about when to have certain tests. These tests can spot problems early. · Cholesterol. Your doctor will tell you how often to have this done based on your age, family history, or other things that can increase your risk for heart attack and stroke. · Blood pressure. Have your blood pressure checked during a routine doctor visit. Your doctor will tell you how often to check your blood pressure based on your age, your blood pressure results, and other factors. · Mammogram. Ask your doctor how often you should have a mammogram, which is an X-ray of your breasts. A mammogram can spot breast cancer before it can be felt and when it is easiest to treat.  
· Pap test and pelvic exam. Ask your doctor how often you should have a Pap test. You may not need to have a Pap test as often as you used to. · Vision. Have your eyes checked every year or two or as often as your doctor suggests. Some experts recommend that you have yearly exams for glaucoma and other age-related eye problems starting at age 48. · Hearing. Tell your doctor if you notice any change in your hearing. You can have tests to find out how well you hear. · Diabetes. Ask your doctor whether you should have tests for diabetes. · Colon cancer. You should begin tests for colon cancer at age 48. You may have one of several tests. Your doctor will tell you how often to have tests based on your age and risk. Risks include whether you already had a precancerous polyp removed from your colon or whether your parents, sisters and brothers, or children have had colon cancer. · Thyroid disease. Talk to your doctor about whether to have your thyroid checked as part of a regular physical exam. Women have an increased chance of a thyroid problem. · Osteoporosis. You should begin tests for bone density at age 72. If you are younger than 72, ask your doctor whether you have factors that may increase your risk for this disease. You may want to have this test before age 72. · Heart attack and stroke risk. At least every 4 to 6 years, you should have your risk for heart attack and stroke assessed. Your doctor uses factors such as your age, blood pressure, cholesterol, and whether you smoke or have diabetes to show what your risk for a heart attack or stroke is over the next 10 years. When should you call for help? Watch closely for changes in your health, and be sure to contact your doctor if you have any problems or symptoms that concern you. Where can you learn more? Go to http://olesya-mikayla.info/. Enter C084 in the search box to learn more about \"Well Visit, Women 50 to 72: Care Instructions. \" Current as of: May 12, 2017 Content Version: 11.4 © 1996-9414 [employer], Incorporated. Care instructions adapted under license by [employer] (which disclaims liability or warranty for this information). If you have questions about a medical condition or this instruction, always ask your healthcare professional. [address] any warranty or liability for your use of this information. Introducing [facility]! [employer] introduces GiftMe patient portal. Now you can access parts of your medical record, email your doctor's office, and request medication refills online. 1. In your internet browser, go to [email or web address] 2. Click on the First Time User? Click Here link in the Sign In box. You will see the New Member Sign Up page. 3. Enter your GiftMe Access Code exactly as it appears below. You will not need to use this code after youve completed the sign-up process. If you do not sign up before the expiration date, you must request a new code. · GiftMe Access Code: [email or web address] Expires: 4/12/2018  9:57 AM 
 
4. Enter the last four digits of your Social Security Number (xxxx) and Date of Birth (mm/dd/yyyy) as indicated and click Submit. You will be taken to the next sign-up page. 5. Create a GiftMe ID. This will be your GiftMe login ID and cannot be changed, so think of one that is secure and easy to remember. 6. Create a GiftMe password. You can change your password at any time. 7. Enter your Password Reset Question and Answer. This can be used at a later time if you forget your password. 8. Enter your e-mail address. You will receive e-mail notification when new information is available in [address] 9. Click Sign Up. You can now view and download portions of your medical record. 10. Click the Download Summary menu link to download a portable copy of your medical information.  
 
If you have questions, please visit the Frequently Asked Questions section of the [employer]. Remember, [employer] is NOT to be used for urgent needs. For medical emergencies, dial 911. Now available from your iPhone and Android! Please provide this summary of care documentation to your next provider. Your primary care clinician is listed as Cecy 51. If you have any questions after today's visit, please call [phone].

## 2018-01-12 NOTE — PROGRESS NOTES
Roldan Fall is a 59 y.o. female (: 1960) presenting to     Chief Complaint   Patient presents with    Physical       Vitals:    18 0920   BP: 124/80   Pulse: 80   Resp: 20   Temp: 98 °F (36.7 °C)   TempSrc: Oral   SpO2: 97%   Weight: 186 lb (84.4 kg)   Height: 5' 4\" (1.626 m)   PainSc:   0 - No pain       Hearing/Vision:      Visual Acuity Screening    Right eye Left eye Both eyes   Without correction: 20/13 20/13 20/13   With correction:          Learning Assessment:     Learning Assessment 2015   PRIMARY LEARNER Patient   HIGHEST LEVEL OF EDUCATION - PRIMARY LEARNER  SOME COLLEGE   BARRIERS PRIMARY LEARNER NONE   CO-LEARNER CAREGIVER No   PRIMARY LANGUAGE ENGLISH   LEARNER PREFERENCE PRIMARY LISTENING   ANSWERED BY self   RELATIONSHIP SELF     Depression Screening:     PHQ over the last two weeks 2018   Little interest or pleasure in doing things Not at all   Feeling down, depressed or hopeless Not at all   Total Score PHQ 2 0     Fall Risk Assessment:   No flowsheet data found. Abuse Screening:     Abuse Screening Questionnaire 2018   Do you ever feel afraid of your partner? N   Are you in a relationship with someone who physically or mentally threatens you? N   Is it safe for you to go home? Y     Coordination of Care Questionaire:   1. Have you been to the ER, urgent care clinic since your last visit? Hospitalized since your last visit? no    2. Have you seen or consulted any other health care providers outside of the [address] [place] [address] since your last visit? Include any pap smears or colon screening. plastic surgery on 3/2017    Advanced Directive:   1. Do you have an Advanced Directive? YES    2. Would you like information on Advanced Directives? NO    Health Maintenance Due   Topic Date Due    BREAST CANCER SCRN MAMMOGRAM  2017    Influenza Age 5 to Adult  2017     Already got flu shot.